# Patient Record
Sex: FEMALE | Race: WHITE | NOT HISPANIC OR LATINO | Employment: UNEMPLOYED | URBAN - METROPOLITAN AREA
[De-identification: names, ages, dates, MRNs, and addresses within clinical notes are randomized per-mention and may not be internally consistent; named-entity substitution may affect disease eponyms.]

---

## 2017-03-02 ENCOUNTER — OFFICE VISIT (OUTPATIENT)
Dept: INTERNAL MEDICINE | Facility: CLINIC | Age: 27
End: 2017-03-02

## 2017-03-02 VITALS
HEIGHT: 65 IN | HEART RATE: 85 BPM | BODY MASS INDEX: 40.39 KG/M2 | DIASTOLIC BLOOD PRESSURE: 84 MMHG | SYSTOLIC BLOOD PRESSURE: 120 MMHG | OXYGEN SATURATION: 99 % | WEIGHT: 242.4 LBS

## 2017-03-02 DIAGNOSIS — E61.1 IRON DEFICIENCY: ICD-10-CM

## 2017-03-02 DIAGNOSIS — E03.9 ACQUIRED HYPOTHYROIDISM: ICD-10-CM

## 2017-03-02 DIAGNOSIS — E06.3 HASHIMOTO'S THYROIDITIS: Primary | ICD-10-CM

## 2017-03-02 LAB
DEPRECATED RDW RBC AUTO: 43.2 FL (ref 37–54)
ERYTHROCYTE [DISTWIDTH] IN BLOOD BY AUTOMATED COUNT: 13.6 % (ref 11.3–14.5)
FERRITIN SERPL-MCNC: 25 NG/ML (ref 10–291)
HCT VFR BLD AUTO: 44.2 % (ref 34.5–44)
HGB BLD-MCNC: 14.4 G/DL (ref 11.5–15.5)
MCH RBC QN AUTO: 28.2 PG (ref 27–31)
MCHC RBC AUTO-ENTMCNC: 32.6 G/DL (ref 32–36)
MCV RBC AUTO: 86.5 FL (ref 80–99)
PLATELET # BLD AUTO: 228 10*3/MM3 (ref 150–450)
PMV BLD AUTO: 10.4 FL (ref 6–12)
RBC # BLD AUTO: 5.11 10*6/MM3 (ref 3.89–5.14)
T4 FREE SERPL-MCNC: 0.76 NG/DL (ref 0.89–1.76)
TSH SERPL DL<=0.05 MIU/L-ACNC: 7.53 MIU/ML (ref 0.35–5.35)
WBC NRBC COR # BLD: 8.44 10*3/MM3 (ref 3.5–10.8)

## 2017-03-02 PROCEDURE — 82728 ASSAY OF FERRITIN: CPT | Performed by: INTERNAL MEDICINE

## 2017-03-02 PROCEDURE — 84481 FREE ASSAY (FT-3): CPT | Performed by: INTERNAL MEDICINE

## 2017-03-02 PROCEDURE — 84439 ASSAY OF FREE THYROXINE: CPT | Performed by: INTERNAL MEDICINE

## 2017-03-02 PROCEDURE — 85027 COMPLETE CBC AUTOMATED: CPT | Performed by: INTERNAL MEDICINE

## 2017-03-02 PROCEDURE — 84443 ASSAY THYROID STIM HORMONE: CPT | Performed by: INTERNAL MEDICINE

## 2017-03-02 PROCEDURE — 99213 OFFICE O/P EST LOW 20 MIN: CPT | Performed by: INTERNAL MEDICINE

## 2017-03-02 NOTE — PROGRESS NOTES
"Hypothyroidism    Subjective   Marli Ayala is a 26 y.o. female is here today for follow-up.    History of Present Illness   Marli is here for a f/u on her hypothyroid, was a little low last time, and denies hair loss, hot flashes, heat or cold intolerance.  Takes thyroid meds in the am, and iron in the pm, knows not to take together.    Current Outpatient Prescriptions:   •  ferrous sulfate 325 (65 FE) MG tablet, Take 1 tablet by mouth daily with dinner. With food, Disp: 30 tablet, Rfl: 5  •  Probiotic Product (PROBIOTIC PO), Take 1 capsule by mouth daily., Disp: , Rfl:   •  SYNTHROID 50 MCG tablet, Take 1 tablet by mouth daily., Disp: 30 tablet, Rfl: 5      The following portions of the patient's history were reviewed and updated as appropriate: allergies, current medications, past family history, past medical history, past social history, past surgical history and problem list.    Review of Systems   Constitutional: Negative.  Negative for chills and fever.   HENT: Negative for ear discharge, ear pain, sinus pressure and sore throat.    Respiratory: Negative for cough, chest tightness and shortness of breath.    Cardiovascular: Negative for chest pain, palpitations and leg swelling.   Gastrointestinal: Negative for diarrhea, nausea and vomiting.   Endocrine: Negative for cold intolerance and heat intolerance.   Musculoskeletal: Negative for arthralgias, back pain and myalgias.   Neurological: Negative for dizziness, syncope and headaches.   Psychiatric/Behavioral: Negative for confusion and sleep disturbance.       Objective   Visit Vitals   • /84   • Pulse 85   • Ht 65\" (165.1 cm)   • Wt 242 lb 6.4 oz (110 kg)   • SpO2 99%  Comment: ra   • BMI 40.34 kg/m2     Physical Exam   Constitutional: She is oriented to person, place, and time. She appears well-developed and well-nourished.   HENT:   Head: Normocephalic and atraumatic.   Mouth/Throat: No oropharyngeal exudate.   Eyes: Conjunctivae are normal. " Pupils are equal, round, and reactive to light.   Neck: Neck supple. No thyromegaly present.   Cardiovascular: Normal rate, regular rhythm and intact distal pulses.    Pulmonary/Chest: Effort normal and breath sounds normal.   Abdominal: Soft. Bowel sounds are normal.   Neurological: She is alert and oriented to person, place, and time.   Skin: Skin is warm and dry.   Psychiatric: She has a normal mood and affect. Judgment normal.   Nursing note and vitals reviewed.        Results for orders placed or performed in visit on 03/02/17   TSH   Result Value Ref Range    TSH 7.529 (H) 0.350 - 5.350 mIU/mL   T4, Free   Result Value Ref Range    Free T4 0.76 (L) 0.89 - 1.76 ng/dL   CBC (No Diff)   Result Value Ref Range    WBC 8.44 3.50 - 10.80 10*3/mm3    RBC 5.11 3.89 - 5.14 10*6/mm3    Hemoglobin 14.4 11.5 - 15.5 g/dL    Hematocrit 44.2 (H) 34.5 - 44.0 %    MCV 86.5 80.0 - 99.0 fL    MCH 28.2 27.0 - 31.0 pg    MCHC 32.6 32.0 - 36.0 g/dL    RDW 13.6 11.3 - 14.5 %    RDW-SD 43.2 37.0 - 54.0 fl    MPV 10.4 6.0 - 12.0 fL    Platelets 228 150 - 450 10*3/mm3   Ferritin   Result Value Ref Range    Ferritin 25.00 10.00 - 291.00 ng/mL             Assessment/Plan   Diagnoses and all orders for this visit:    Hashimoto's thyroiditis  -     TSH  -     T4, Free  -     T3, free    Iron deficiency  -     CBC (No Diff)  -     Ferritin    Acquired hypothyroidism  -     TSH  -     T4, Free  -     T3, free               Return in about 6 months (around 9/2/2017).

## 2017-03-03 LAB — T3FREE SERPL-MCNC: 3.1 PG/ML (ref 2–4.4)

## 2017-03-12 RX ORDER — LEVOTHYROXINE SODIUM 0.07 MG/1
75 TABLET ORAL DAILY
Qty: 30 TABLET | Refills: 5 | Status: SHIPPED | OUTPATIENT
Start: 2017-03-12 | End: 2017-10-26 | Stop reason: SDUPTHER

## 2017-10-26 ENCOUNTER — OFFICE VISIT (OUTPATIENT)
Dept: INTERNAL MEDICINE | Facility: CLINIC | Age: 27
End: 2017-10-26

## 2017-10-26 VITALS
WEIGHT: 245.25 LBS | SYSTOLIC BLOOD PRESSURE: 126 MMHG | BODY MASS INDEX: 40.81 KG/M2 | DIASTOLIC BLOOD PRESSURE: 74 MMHG | HEART RATE: 82 BPM | TEMPERATURE: 98.5 F | RESPIRATION RATE: 18 BRPM | OXYGEN SATURATION: 99 %

## 2017-10-26 DIAGNOSIS — E03.9 ACQUIRED HYPOTHYROIDISM: Primary | ICD-10-CM

## 2017-10-26 DIAGNOSIS — R53.83 FATIGUE, UNSPECIFIED TYPE: ICD-10-CM

## 2017-10-26 DIAGNOSIS — E61.1 IRON DEFICIENCY: ICD-10-CM

## 2017-10-26 LAB
DEPRECATED RDW RBC AUTO: 42.6 FL (ref 37–54)
ERYTHROCYTE [DISTWIDTH] IN BLOOD BY AUTOMATED COUNT: 13.6 % (ref 11.3–14.5)
FERRITIN SERPL-MCNC: 29 NG/ML (ref 10–291)
HCT VFR BLD AUTO: 45.8 % (ref 34.5–44)
HGB BLD-MCNC: 14.9 G/DL (ref 11.5–15.5)
MCH RBC QN AUTO: 28.3 PG (ref 27–31)
MCHC RBC AUTO-ENTMCNC: 32.5 G/DL (ref 32–36)
MCV RBC AUTO: 86.9 FL (ref 80–99)
PLATELET # BLD AUTO: 247 10*3/MM3 (ref 150–450)
PMV BLD AUTO: 10.3 FL (ref 6–12)
RBC # BLD AUTO: 5.27 10*6/MM3 (ref 3.89–5.14)
T4 FREE SERPL-MCNC: 1.33 NG/DL (ref 0.89–1.76)
TSH SERPL DL<=0.05 MIU/L-ACNC: 2.35 MIU/ML (ref 0.35–5.35)
WBC NRBC COR # BLD: 8.61 10*3/MM3 (ref 3.5–10.8)

## 2017-10-26 PROCEDURE — 84443 ASSAY THYROID STIM HORMONE: CPT | Performed by: INTERNAL MEDICINE

## 2017-10-26 PROCEDURE — 99213 OFFICE O/P EST LOW 20 MIN: CPT | Performed by: INTERNAL MEDICINE

## 2017-10-26 PROCEDURE — 84439 ASSAY OF FREE THYROXINE: CPT | Performed by: INTERNAL MEDICINE

## 2017-10-26 PROCEDURE — 86038 ANTINUCLEAR ANTIBODIES: CPT | Performed by: INTERNAL MEDICINE

## 2017-10-26 PROCEDURE — 85027 COMPLETE CBC AUTOMATED: CPT | Performed by: INTERNAL MEDICINE

## 2017-10-26 PROCEDURE — 82728 ASSAY OF FERRITIN: CPT | Performed by: INTERNAL MEDICINE

## 2017-10-26 RX ORDER — FERROUS SULFATE 325(65) MG
325 TABLET ORAL
Qty: 30 TABLET | Refills: 5 | Status: SHIPPED | OUTPATIENT
Start: 2017-10-26

## 2017-10-26 RX ORDER — LEVOTHYROXINE SODIUM 0.07 MG/1
75 TABLET ORAL DAILY
Qty: 30 TABLET | Refills: 0 | Status: SHIPPED | OUTPATIENT
Start: 2017-10-26 | End: 2018-01-09 | Stop reason: SDUPTHER

## 2017-10-26 NOTE — PROGRESS NOTES
Hypothyroidism (f/u)    Subjective   Malri Ayala is a 27 y.o. female is here today for follow-up.    History of Present Illness   Here for f/u on hypothyroid, and iron deficiency,was low last visit, and dose increased.  Continues to have worsening fatigue, feels like she can fall asleep instantly.  Periods are average, heavy 1st day.      Current Outpatient Prescriptions:   •  levothyroxine (SYNTHROID) 75 MCG tablet, Take 1 tablet by mouth Daily., Disp: 30 tablet, Rfl: 0  •  Probiotic Product (PROBIOTIC PO), Take 1 capsule by mouth daily., Disp: , Rfl:   •  ferrous sulfate 325 (65 FE) MG tablet, Take 1 tablet by mouth Daily With Dinner. With food, Disp: 30 tablet, Rfl: 5      The following portions of the patient's history were reviewed and updated as appropriate: allergies, current medications, past family history, past medical history, past social history, past surgical history and problem list.    Review of Systems   Constitutional: Positive for fatigue. Negative for chills and fever.   HENT: Negative for ear discharge, ear pain, sinus pressure and sore throat.    Respiratory: Negative for cough, chest tightness and shortness of breath.    Cardiovascular: Negative for chest pain, palpitations and leg swelling.   Gastrointestinal: Negative for diarrhea, nausea and vomiting.   Musculoskeletal: Negative for arthralgias, back pain and myalgias.   Neurological: Negative for dizziness, syncope and headaches.   Psychiatric/Behavioral: Negative for confusion and sleep disturbance.       Objective   /74 (BP Location: Left arm, Patient Position: Sitting)  Pulse 82  Temp 98.5 °F (36.9 °C)  Resp 18  Wt 245 lb 4 oz (111 kg)  SpO2 99%  BMI 40.81 kg/m2  Physical Exam   Constitutional: She is oriented to person, place, and time. She appears well-developed and well-nourished.   HENT:   Head: Normocephalic and atraumatic.   Right Ear: External ear normal.   Left Ear: External ear normal.   Mouth/Throat: No  oropharyngeal exudate.   Eyes: Conjunctivae are normal. Pupils are equal, round, and reactive to light.   Neck: Neck supple. No thyromegaly present.   Cardiovascular: Normal rate, regular rhythm and intact distal pulses.    Pulmonary/Chest: Effort normal and breath sounds normal.   Abdominal: Soft. Bowel sounds are normal. She exhibits no distension. There is no tenderness.   Musculoskeletal: She exhibits no edema.   Neurological: She is alert and oriented to person, place, and time. No cranial nerve deficit.   Skin: Skin is warm and dry.   Psychiatric: She has a normal mood and affect. Judgment normal.   Nursing note and vitals reviewed.        Results for orders placed or performed in visit on 10/26/17   CBC (No Diff)   Result Value Ref Range    WBC 8.61 3.50 - 10.80 10*3/mm3    RBC 5.27 (H) 3.89 - 5.14 10*6/mm3    Hemoglobin 14.9 11.5 - 15.5 g/dL    Hematocrit 45.8 (H) 34.5 - 44.0 %    MCV 86.9 80.0 - 99.0 fL    MCH 28.3 27.0 - 31.0 pg    MCHC 32.5 32.0 - 36.0 g/dL    RDW 13.6 11.3 - 14.5 %    RDW-SD 42.6 37.0 - 54.0 fl    MPV 10.3 6.0 - 12.0 fL    Platelets 247 150 - 450 10*3/mm3   Ferritin   Result Value Ref Range    Ferritin 29.00 10.00 - 291.00 ng/mL   TSH   Result Value Ref Range    TSH 2.345 0.350 - 5.350 mIU/mL   Nuclear Antigen Antibody, IFA   Result Value Ref Range    SOFY Negative    T4, Free   Result Value Ref Range    Free T4 1.33 0.89 - 1.76 ng/dL             Assessment/Plan   Diagnoses and all orders for this visit:    Acquired hypothyroidism  -     levothyroxine (SYNTHROID) 75 MCG tablet; Take 1 tablet by mouth Daily.  -     TSH  -     T4, Free    Iron deficiency  -     ferrous sulfate 325 (65 FE) MG tablet; Take 1 tablet by mouth Daily With Dinner. With food  -     CBC (No Diff)  -     Ferritin    Fatigue, unspecified type  -     Nuclear Antigen Antibody, IFA                 Return in about 6 months (around 4/26/2018).

## 2017-10-28 LAB — ANA SER QL IA: NEGATIVE

## 2017-11-03 ENCOUNTER — TELEPHONE (OUTPATIENT)
Dept: INTERNAL MEDICINE | Facility: CLINIC | Age: 27
End: 2017-11-03

## 2017-11-06 NOTE — TELEPHONE ENCOUNTER
Please let Pt. Know:Labs including iron and thyroid are normal/ better.  Please continue current regimen.    letterin the mail.    thanks

## 2018-01-09 DIAGNOSIS — E03.9 ACQUIRED HYPOTHYROIDISM: ICD-10-CM

## 2018-01-10 RX ORDER — LEVOTHYROXINE SODIUM 0.07 MG/1
75 TABLET ORAL DAILY
Qty: 30 TABLET | Refills: 0 | Status: SHIPPED | OUTPATIENT
Start: 2018-01-10